# Patient Record
(demographics unavailable — no encounter records)

---

## 2025-05-13 NOTE — PHYSICAL EXAM
[de-identified] : The patient appears well nourished  and in no apparent distress.  The patient is alert and oriented to person, place, and time.   Affect and mood appear normal.    The head is normocephalic and atraumatic.  The eyes reveal normal sclera and extra ocular muscles are intact.   The neck appears normal with no jugular venous distention or masses noted.   Skin shows normal turgor with no evidence of eczema or psoriasis.  No respiratory distress noted.  The patient ambulates with a normal gait.  Exam of the right hip reveals full range of motion without pain.  There is good strength about the hip.  There is no tenderness to palpation about the hip.  No soft tissue swelling noted.  No warmth erythema.  No ecchymosis noted.  There is tenderness to palpation about the right ischial tuberosity adjacent to the hamstring insertion.  Pulses are intact distally.  Capillary refill is normal. There is no edema or lymphadenopathy. [de-identified] : X-ray of the pelvis and a frog lateral view of the right hip were obtained.  The joint spaces of the hips are well-maintained.  No evidence of any significant degenerative arthritis is noted.  No evidence of any fracture noted.  No subluxation or dislocation about the hips noted.  SI joints appear intact.

## 2025-05-13 NOTE — PHYSICAL EXAM
[de-identified] : The patient appears well nourished  and in no apparent distress.  The patient is alert and oriented to person, place, and time.   Affect and mood appear normal.    The head is normocephalic and atraumatic.  The eyes reveal normal sclera and extra ocular muscles are intact.   The neck appears normal with no jugular venous distention or masses noted.   Skin shows normal turgor with no evidence of eczema or psoriasis.  No respiratory distress noted.  The patient ambulates with a normal gait.  Exam of the right hip reveals full range of motion without pain.  There is good strength about the hip.  There is no tenderness to palpation about the hip.  No soft tissue swelling noted.  No warmth erythema.  No ecchymosis noted.  There is tenderness to palpation about the right ischial tuberosity adjacent to the hamstring insertion.  Pulses are intact distally.  Capillary refill is normal. There is no edema or lymphadenopathy. [de-identified] : X-ray of the pelvis and a frog lateral view of the right hip were obtained.  The joint spaces of the hips are well-maintained.  No evidence of any significant degenerative arthritis is noted.  No evidence of any fracture noted.  No subluxation or dislocation about the hips noted.  SI joints appear intact.

## 2025-05-13 NOTE — DISCUSSION/SUMMARY
[de-identified] : The patient presents today for evaluation regarding right hip and hamstring pain.  The physical exam reveals some tenderness over the hamstring insertion onto the ischial tuberosity of the right side.  Due to the patient's longstanding history of hamstring injury I recommend an MRI to evaluate the area for evidence of early recurrence of tearing or any scarring in that area from prior injury.  I will see her back after the MRI for follow-up and review.  At least 30 minutes was spent performing the evaluation and management on today's office visit.  This includes but is not limited to preparing to see patient including review of any test results or outside medical records, obtaining and/or reviewing separately obtained history, performing examination and evaluation, counseling and educating the patient on their diagnosis and treatment recommendations, ordering medications, tests, or procedures, documenting clinical information in the electronic health record, independently interpreting results (not separately reported) and communicating results to the patient, and coordination of care.

## 2025-05-13 NOTE — HISTORY OF PRESENT ILLNESS
LAB REVIEWED:    Has upcoming appointment  with Dr Cerda on: 2/27/18     At that visit, I will review & discuss  lab results personally with patient. [de-identified] : This patient presents today complaining of right hip pain.  She has a history of a hamstring injury on the right side.  She noted pain over the right ischial tuberosity with activity.  Pain level 4 out of 10.  Pain is increasing over the last few months.  She does dance professionally.  She is having problems stretching her legs secondary to the pain as well.  Denies radicular symptoms or numbness and tingling.  Denies any recent injury.

## 2025-05-13 NOTE — HISTORY OF PRESENT ILLNESS
[de-identified] : This patient presents today complaining of right hip pain.  She has a history of a hamstring injury on the right side.  She noted pain over the right ischial tuberosity with activity.  Pain level 4 out of 10.  Pain is increasing over the last few months.  She does dance professionally.  She is having problems stretching her legs secondary to the pain as well.  Denies radicular symptoms or numbness and tingling.  Denies any recent injury.

## 2025-05-13 NOTE — DISCUSSION/SUMMARY
[de-identified] : The patient presents today for evaluation regarding right hip and hamstring pain.  The physical exam reveals some tenderness over the hamstring insertion onto the ischial tuberosity of the right side.  Due to the patient's longstanding history of hamstring injury I recommend an MRI to evaluate the area for evidence of early recurrence of tearing or any scarring in that area from prior injury.  I will see her back after the MRI for follow-up and review.  At least 30 minutes was spent performing the evaluation and management on today's office visit.  This includes but is not limited to preparing to see patient including review of any test results or outside medical records, obtaining and/or reviewing separately obtained history, performing examination and evaluation, counseling and educating the patient on their diagnosis and treatment recommendations, ordering medications, tests, or procedures, documenting clinical information in the electronic health record, independently interpreting results (not separately reported) and communicating results to the patient, and coordination of care.